# Patient Record
Sex: FEMALE | Race: WHITE
[De-identification: names, ages, dates, MRNs, and addresses within clinical notes are randomized per-mention and may not be internally consistent; named-entity substitution may affect disease eponyms.]

---

## 2024-11-12 ENCOUNTER — HOSPITAL ENCOUNTER (OUTPATIENT)
Facility: HOSPITAL | Age: 39
Setting detail: RECURRING SERIES
Discharge: HOME | End: 2024-11-15
Payer: COMMERCIAL

## 2024-11-12 PROCEDURE — 97162 PT EVAL MOD COMPLEX 30 MIN: CPT

## 2024-11-12 PROCEDURE — 97140 MANUAL THERAPY 1/> REGIONS: CPT

## 2024-11-12 PROCEDURE — 97110 THERAPEUTIC EXERCISES: CPT

## 2024-11-12 NOTE — PROGRESS NOTES
[facility]  [address] [facility] [address] [place] [address] Ph:[phone] Fx: [phone]    PLAN OF CARE/ Statement of Necessity for Physical Therapy Services           Patient name: Jameson Ruff Start of Care: 2024   Referral source: Edvin Tony MD : 1985    Medical Diagnosis: Other low back pain [M54.59]       Onset Date: 9/15/2024   Treatment Diagnosis: M54.59  OTHER LOWER BACK PAIN                                     Prior Hospitalization: see medical history Provider#: [id number]   Medications: Verified on Patient Summary List     Comorbidities: None listed    Prior Level of Function: The patient reports that she was able to go hiking, and sleep with greater ease.     The Plan of Care and following information is based on the information from the initial evaluation.    Assessment / key information:  The patient is a 39 year old female with a chief complaint of low back pain and left leg tingling with symptoms extending down into the top of her foot with an onset date of 9/15/2024. She reports that her symptoms worsened after a lot of hiking. She has taken a prednisone dose pack to good effect, but continues to have pain. She presents with a notable left upslip upon presentation to the clinic. The patient has impairments consisting of pain, decreased ROM, decreased flexibility (+ travis test), decreased strength, limited ease of sleeping, static standing, sitting. The patient will continue to benefit from skilled PT in order to address the aforementioned impairments.     Evaluation Complexity:  History:  MEDIUM  Complexity : 1-2 comorbidities / personal factors will impact the outcome/ POC ; Examination:  MEDIUM Complexity : 3 Standardized tests and measures addressin body structure, function, activity limitation and / or participation in recreation  ;Presentation:  MEDIUM Complexity : Evolving with changing characteristics  ;Clinical Decision

## 2024-11-12 NOTE — PROGRESS NOTES
PHYSICAL / OCCUPATIONAL THERAPY - DAILY TREATMENT NOTE     Patient Name: Jameson Ruff    Date: 2024    : 1985  Insurance: Payor: SHILPA / Plan: SHARRI [employer]SEY [employer] / Product Type: *No Product type* /      Patient  verified Yes     Visit #   Current / Total 1 24   Time   In / Out 3:50 4:30   Pain   In / Out 2/10 4/10   Subjective Functional Status/Changes: See IE   Changes to:  Allergies, Med Hx, Sx Hx?   no       TREATMENT AREA =  Other low back pain [M54.59]    If an interpreting service is utilized for treatment of this patient, the contents of this document represent the material reviewed with the patient via the .     OBJECTIVE  Therapeutic Procedures:  Tx Min Billable or 1:1 Min (if diff from Tx Min) Procedure, Rationale, Specifics   15  04447 Therapeutic Exercise (timed):  increase ROM, strength, coordination, balance, and proprioception to improve patient's ability to progress to PLOF and address remaining functional goals. (see flow sheet as applicable)    Details if applicable:       10  73174 Neuromuscular Re-Education (timed):  improve balance, coordination, kinesthetic sense, posture, core stability and proprioception to improve patient's ability to develop conscious control of individual muscles and awareness of position of extremities in order to progress to PLOF and address remaining functional goals. (see flow sheet as applicable)    Details if applicable:  QP LE, bird dog, bridges   8  06459 Manual Therapy (timed):  decrease pain, increase ROM, and increase tissue extensibility to improve patient's ability to progress to PLOF and address remaining functional goals.  The manual therapy interventions were performed at a separate and distinct time from the therapeutic activities interventions . Details: leg pull of left upslip SIJ with patient in prone. Shotgun maneuver.    Maneuver explained to the patient, contraindications were reviewed, and permission attained prior to

## 2024-11-20 ENCOUNTER — APPOINTMENT (OUTPATIENT)
Facility: HOSPITAL | Age: 39
End: 2024-11-20
Payer: COMMERCIAL

## 2024-11-22 ENCOUNTER — APPOINTMENT (OUTPATIENT)
Facility: HOSPITAL | Age: 39
End: 2024-11-22
Payer: COMMERCIAL

## 2024-11-26 ENCOUNTER — HOSPITAL ENCOUNTER (OUTPATIENT)
Facility: HOSPITAL | Age: 39
Setting detail: RECURRING SERIES
Discharge: HOME | End: 2024-11-29
Payer: COMMERCIAL

## 2024-11-26 PROCEDURE — 97140 MANUAL THERAPY 1/> REGIONS: CPT

## 2024-11-26 PROCEDURE — 97110 THERAPEUTIC EXERCISES: CPT

## 2024-11-26 PROCEDURE — 97112 NEUROMUSCULAR REEDUCATION: CPT

## 2024-11-26 NOTE — PROGRESS NOTES
:  []  Other:    Latoya Dobbins, PT    11/26/2024    3:14 PM    Future Appointments   Date Time Provider Department Center   11/29/2024  3:10 PM Latoya Dobbins, PT [facility]   12/4/2024  2:30 PM Kori Sanchez, PT [facility]   12/6/2024  3:10 PM Latoya Dobbins, PT [facility]   12/9/2024  3:10 PM Latoya Dobbins, PT [facility]   12/11/2024  3:10 PM Terrance Drew, PT [facility]   12/16/2024  3:10 PM Latoya Dobbins, PT [facility]   12/18/2024  3:10 PM Terrance Drew, PT [facility]   12/27/2024  2:30 PM Terrance Drew, PT [facility]   12/31/2024  9:50 AM Terrance Drew, PT [facility]   1/3/2025  2:30 PM Terrance Drwe, PT [facility]

## 2024-11-29 ENCOUNTER — HOSPITAL ENCOUNTER (OUTPATIENT)
Facility: HOSPITAL | Age: 39
Setting detail: RECURRING SERIES
Discharge: HOME | End: 2024-12-02
Payer: COMMERCIAL

## 2024-11-29 PROCEDURE — 97112 NEUROMUSCULAR REEDUCATION: CPT

## 2024-11-29 PROCEDURE — 97110 THERAPEUTIC EXERCISES: CPT

## 2024-11-29 NOTE — PROGRESS NOTES
PHYSICAL / OCCUPATIONAL THERAPY - DAILY TREATMENT NOTE     Patient Name: Jameson Ruff    Date: 2024    : 1985  Insurance: Payor: SHILPA / Plan: SHARRI MASSEY [employer] / Product Type: *No Product type* /      Patient  verified Yes     Visit #   Current / Total 3 24   Time   In / Out 310pm 359pm   Pain   In / Out 3 1   Subjective Functional Status/Changes: Pt states yesterday she had a lot of pain but today is better. States she isn't really sure why.     Pt does states she has been able to lay down when sleeping and extend her leg which is way better than a month ago.    Changes to:  Allergies, Med Hx, Sx Hx?   no       TREATMENT AREA =  Other low back pain [M54.59]    If an interpreting service is utilized for treatment of this patient, the contents of this document represent the material reviewed with the patient via the .     OBJECTIVE    Modalities Rationale:     decrease pain and increase tissue extensibility to improve patient's ability to progress to PLOF and address remaining functional goals.     min [] Estim Unattended, type/location:                                      []  w/ice    []  w/heat    min [] Estim Attended, type/location:                                     []  w/US     []  w/ice    []  w/heat    []  TENS insruct      min []  Mechanical Traction: type/lbs                   []  pro   []  sup   []  int   []  cont    []  before manual    []  after manual    min []  Ultrasound, settings/location:      min []  Iontophoresis w/ dexamethasone, location:                                               []  take home patch       []  in clinic     10   min  unbilled []  Ice     [x]  Heat    location/position: To low back reclined    min []  Paraffin,  details:     min []  Vasopneumatic Device, press/temp:     min []  Whirlpool / Fluido:    If using vaso (only need to measure limb vaso being performed on)      pre-treatment girth :       post-treatment girth :       measured at  (landmark location) :      min []  Other:    Skin assessment post-treatment (if applicable):    [x]  intact    []  redness- no adverse reaction                 []redness - adverse reaction:         Therapeutic Procedures:  Tx Min Billable or 1:1 Min (if diff from Tx Min) Procedure, Rationale, Specifics   24  07064 Therapeutic Exercise (timed):  increase ROM, strength, coordination, balance, and proprioception to improve patient's ability to progress to PLOF and address remaining functional goals. (see flow sheet as applicable)    Details if applicable:       15  94519 Neuromuscular Re-Education (timed):  improve balance, coordination, kinesthetic sense, posture, core stability and proprioception to improve patient's ability to develop conscious control of individual muscles and awareness of position of extremities in order to progress to PLOF and address remaining functional goals. (see flow sheet as applicable)    Details if applicable:            Details if applicable:           Details if applicable:            Details if applicable:     39  [facility] Totals Reminder: bill using total billable min of TIMED therapeutic procedures (example: do not include dry needle or estim unattended, both untimed codes, in totals to left)  8-22 min = 1 unit; 23-37 min = 2 units; 38-52 min = 3 units; 53-67 min = 4 units; 68-82 min = 5 units   Total Total     TOTAL TREATMENT TIME:        49     [x]  Patient Education billed concurrently with other procedures   [x] Review HEP    [] Progressed/Changed HEP, detail:    [] Other detail:       Objective Information/Functional Measures/Assessment    Held MT today due to good tolerance to exercise not in supine/no pressure on hip. Pt did have pain with supine pressure on left hip today. Progressed to quadruped FH, LE ext, and thread the needle today. Educated to continue with stretching and expected progression. Pt does report use of supine nerve glides at home as part of HEP.    Patient will

## 2024-12-04 ENCOUNTER — HOSPITAL ENCOUNTER (OUTPATIENT)
Facility: HOSPITAL | Age: 39
Setting detail: RECURRING SERIES
Discharge: HOME | End: 2024-12-07
Payer: COMMERCIAL

## 2024-12-04 PROCEDURE — 97112 NEUROMUSCULAR REEDUCATION: CPT

## 2024-12-04 PROCEDURE — 97110 THERAPEUTIC EXERCISES: CPT

## 2024-12-04 PROCEDURE — 97140 MANUAL THERAPY 1/> REGIONS: CPT

## 2024-12-04 NOTE — PROGRESS NOTES
left, 4/5 MMT right, 4-/5 MMT EXT B   The patient will report resolution of left LE referral pain in order to improve ease of community ambulation efficiency.              IE: significant improvement following SIJ manip              Current 11/29/24: pt states she can lay down to sleep and extend her legs without the burning pain down some so that is much better  The patient will demonstrate squat lifts of 15# KB without increase in pain with good form to maximize ease of chore production.              IE: increased symptoms without squatting      PLAN  Yes  Continue plan of care  []  Upgrade activities as tolerated  []  Discharge due to :  []  Other:    Mabel Weems PTA    12/4/2024    2:40 PM    Future Appointments   Date Time Provider Department Center   12/6/2024  3:10 PM Latoya Dobbins, PT [facility]   12/9/2024  3:10 PM Latoya Dobbins, PT [facility]   12/11/2024  3:10 PM Terrance Drew, PT [facility]   12/16/2024  3:10 PM Latoya Dobbins, PT [facility]   12/18/2024  3:10 PM Mabel Weems, PTA [facility]   12/27/2024  2:30 PM Terrance Drew, PT [facility]   12/31/2024  9:50 AM Terrance Drew, PT [facility]   1/3/2025  2:30 PM Terrance Drew, PT [facility]

## 2024-12-06 ENCOUNTER — HOSPITAL ENCOUNTER (OUTPATIENT)
Facility: HOSPITAL | Age: 39
Setting detail: RECURRING SERIES
Discharge: HOME | End: 2024-12-09
Payer: COMMERCIAL

## 2024-12-06 PROCEDURE — 97112 NEUROMUSCULAR REEDUCATION: CPT

## 2024-12-06 PROCEDURE — 97140 MANUAL THERAPY 1/> REGIONS: CPT

## 2024-12-06 PROCEDURE — 97110 THERAPEUTIC EXERCISES: CPT

## 2024-12-06 NOTE — PROGRESS NOTES
PHYSICAL / OCCUPATIONAL THERAPY - DAILY TREATMENT NOTE     Patient Name: Jameson Ruff    Date: 2024    : 1985  Insurance: Payor: NILDA MASSEY / Plan: SHARRI MASSEY [facility] / Product Type: *No Product type* /      Patient  verified Yes     Visit #   Current / Total 5 24   Time   In / Out 245pm 333pm   Pain   In / Out 3 6   Subjective Functional Status/Changes: Pt states she still has the pain in her hip and back and that she is taking more ibuprofen to manage the pain    Changes to:  Allergies, Med Hx, Sx Hx?   no       TREATMENT AREA =  Other low back pain [M54.59]    If an interpreting service is utilized for treatment of this patient, the contents of this document represent the material reviewed with the patient via the .     OBJECTIVE    Modalities Rationale:     decrease pain and increase tissue extensibility to improve patient's ability to progress to PLOF and address remaining functional goals.     min [] Estim Unattended, type/location:                                      []  w/ice    []  w/heat    min [] Estim Attended, type/location:                                     []  w/US     []  w/ice    []  w/heat    []  TENS insruct      min []  Mechanical Traction: type/lbs                   []  pro   []  sup   []  int   []  cont    []  before manual    []  after manual    min []  Ultrasound, settings/location:      min []  Iontophoresis w/ dexamethasone, location:                                               []  take home patch       []  in clinic     10   min  unbilled []  Ice     [x]  Heat    location/position: To low back/left glut in right sidelying    min []  Paraffin,  details:     min []  Vasopneumatic Device, press/temp:     min []  Whirlpool / Fluido:    If using vaso (only need to measure limb vaso being performed on)      pre-treatment girth :       post-treatment girth :       measured at (landmark location) :      min []  Other:    Skin assessment post-treatment (if 
130

## 2024-12-09 ENCOUNTER — HOSPITAL ENCOUNTER (OUTPATIENT)
Facility: HOSPITAL | Age: 39
Setting detail: RECURRING SERIES
Discharge: HOME | End: 2024-12-12
Payer: COMMERCIAL

## 2024-12-09 PROCEDURE — 97110 THERAPEUTIC EXERCISES: CPT

## 2024-12-09 PROCEDURE — 97112 NEUROMUSCULAR REEDUCATION: CPT

## 2024-12-09 PROCEDURE — 97535 SELF CARE MNGMENT TRAINING: CPT

## 2024-12-09 NOTE — PROGRESS NOTES
PHYSICAL / OCCUPATIONAL THERAPY - DAILY TREATMENT NOTE     Patient Name: Jameson Ruff    Date: 2024    : 1985  Insurance: Payor: [place] SHILPA / Plan: SHARRI MASSEY [facility] / Product Type: *No Product type* /      Patient  verified Yes     Visit #   Current / Total 6 24   Time   In / Out 238pm 331pm   Pain   In / Out 4 2-3   Subjective Functional Status/Changes: Pt states she is the same and still having pain. Pt denies pain with bowel movements. States continues pain with standing and movement. Pt reports same pain with sexual activities as with other tasks with no change with positioning.    Changes to:  Allergies, Med Hx, Sx Hx?   no       TREATMENT AREA =  Other low back pain [M54.59]    If an interpreting service is utilized for treatment of this patient, the contents of this document represent the material reviewed with the patient via the .     OBJECTIVE    Modalities Rationale:   pt declined    Therapeutic Procedures:  Tx Min Billable or 1:1 Min (if diff from Tx Min) Procedure, Rationale, Specifics   28  43048 Therapeutic Exercise (timed):  increase ROM, strength, coordination, balance, and proprioception to improve patient's ability to progress to PLOF and address remaining functional goals. (see flow sheet as applicable)    Details if applicable:       17  85560 Neuromuscular Re-Education (timed):  improve balance, coordination, kinesthetic sense, posture, core stability and proprioception to improve patient's ability to develop conscious control of individual muscles and awareness of position of extremities in order to progress to PLOF and address remaining functional goals. (see flow sheet as applicable)    Details if applicable:     8  62552 Self Care/Home Management (timed):  improve patient knowledge and understanding of positioning and activity modification  to improve patient's ability to progress to PLOF and address remaining functional goals.  (see flow sheet as

## 2024-12-09 NOTE — PROGRESS NOTES
[facility]  [address] [facility] [address] [place] [address] - Ph: [phone]   Fx: [phone]    PHYSICAL THERAPY PROGRESS NOTE      Patient name: Jameson Ruff Start of Care: 2024   Referral source: Edvin Tony MD : 1985               Medical Diagnosis: Other low back pain [M54.59]        Onset Date: 9/15/2024   Treatment Diagnosis: M54.59  OTHER LOWER BACK PAIN                                     Prior Hospitalization: see medical history Provider#: [id number]   Medications: Verified on Patient Summary List      Comorbidities: None listed     Prior Level of Function: The patient reports that she was able to go hiking, and sleep with greater ease.     Reporting Period: 2024-2024  Visits from Start of Care: 6    Missed Visits: 0    Goals/Measure of Progress: To be achieved in 12 weeks:    Short Term Goals: To be accomplished in 2 weeks  The patient will demonstrate independence and compliance with HEP to maximize therapeutic benefit.              IE: issued HEP              PN 24: states she has been doing the exercise at home, but still laying flat on the back hurt     The patient will improve 90/90 test to 30 degrees to improve ease of ADLs.              IE: 50 degrees left              PN 24: 25 degrees left MET     Long Term Goals: To be accomplished in 24 weeks  The patient will improve Oswestry score to 15% disability in order to improve quality of life.              IE: 62%               PN 24: 56% - PROGRESSING     The patient will attain hip ABD/EXT strength to 5/5 MMT to maximize stability in stance.              IE: 4-/5 MMT ABD left, 4/5 MMT right, 4-/5 MMT EXT B                PN 24: hip AB left 4+/5 (pain with lifting but reports alleviate pain with resistance), right 4/5 (no pain with lifting but pain worse on left with resistance applied); hip ext left NT/5, right NT/5     The patient will report

## 2024-12-11 ENCOUNTER — HOSPITAL ENCOUNTER (OUTPATIENT)
Facility: HOSPITAL | Age: 39
Setting detail: RECURRING SERIES
Discharge: HOME | End: 2024-12-14
Payer: COMMERCIAL

## 2024-12-11 PROCEDURE — 97530 THERAPEUTIC ACTIVITIES: CPT

## 2024-12-11 PROCEDURE — 97110 THERAPEUTIC EXERCISES: CPT

## 2024-12-11 PROCEDURE — 97112 NEUROMUSCULAR REEDUCATION: CPT

## 2024-12-11 PROCEDURE — 97140 MANUAL THERAPY 1/> REGIONS: CPT

## 2024-12-11 NOTE — PROGRESS NOTES
PHYSICAL / OCCUPATIONAL THERAPY - DAILY TREATMENT NOTE     Patient Name: Jameson Ruff    Date: 2024    : 1985  Insurance: Payor: NILDA MASSEY / Plan: SHARRI MASSEY [facility] / Product Type: *No Product type* /      Patient  verified Yes     Visit #   Current / Total 7 24   Time   In / Out 3:10 4:07   Pain   In / Out 3/10 5/10   Subjective Functional Status/Changes: The patient reports that her back left lower back feels about the same.   Changes to:  Allergies, Med Hx, Sx Hx?   no       TREATMENT AREA =  Other low back pain [M54.59]    If an interpreting service is utilized for treatment of this patient, the contents of this document represent the material reviewed with the patient via the .     OBJECTIVE  Modality rationale: decrease pain and increase tissue extensibility to improve the patient’s ability to improve ADL ease.    Min Type Additional Details     10 []  Ice     [x]  heat  []  Ice massage  []  Laser   []  Anodyne Position: right sidelying  Location: left hip/glute   [] Skin assessment post-treatment:  []intact []redness- no adverse reaction    []redness - adverse reaction:     Therapeutic Procedures:  Tx Min Billable or 1:1 Min (if diff from Tx Min) Procedure, Rationale, Specifics   27 15 51080 Therapeutic Exercise (timed):  increase ROM, strength, coordination, balance, and proprioception to improve patient's ability to progress to PLOF and address remaining functional goals. (see flow sheet as applicable)    Details if applicable:       12 0 70906 Neuromuscular Re-Education (timed):  improve balance, coordination, kinesthetic sense, posture, core stability and proprioception to improve patient's ability to develop conscious control of individual muscles and awareness of position of extremities in order to progress to PLOF and address remaining functional goals. (see flow sheet as applicable)    Details if applicable:     8 8 31329 Manual Therapy (timed):  decrease pain,

## 2024-12-16 ENCOUNTER — HOSPITAL ENCOUNTER (OUTPATIENT)
Facility: HOSPITAL | Age: 39
Setting detail: RECURRING SERIES
Discharge: HOME | End: 2024-12-19
Payer: COMMERCIAL

## 2024-12-16 PROCEDURE — 97112 NEUROMUSCULAR REEDUCATION: CPT

## 2024-12-16 PROCEDURE — 97110 THERAPEUTIC EXERCISES: CPT

## 2024-12-16 NOTE — PROGRESS NOTES
38-52 min = 3 units; 53-67 min = 4 units; 68-82 min = 5 units   Total Total     TOTAL TREATMENT TIME:        38     [x]  Patient Education billed concurrently with other procedures   [x] Review HEP    [] Progressed/Changed HEP, detail:    [] Other detail:       Objective Information/Functional Measures/Assessment    Pt progressed to unilateral side bends with 15# KB today with no significant change in pain. Educated pt to continue with exercise at home and monitor change to progress of exercise in clinic today. Pt able to perform quadrpued LE ext today without pain but does report continued pain with ext in prone even when trying at home    Patient will continue to benefit from skilled PT / OT services to modify and progress therapeutic interventions, analyze and address functional mobility deficits, analyze and address ROM deficits, analyze and address strength deficits, analyze and address soft tissue restrictions, and analyze and cue for proper movement patterns to address functional deficits and attain remaining goals.    Progress toward goals / Updated goals:  []  See Progress Note/Recertification    Short Term Goals: To be accomplished in 2 weeks  The patient will demonstrate independence and compliance with HEP to maximize therapeutic benefit.              IE: issued HEP              PN 12/9/24: states she has been doing the exercise at home, but still laying flat on the back hurt     The patient will improve 90/90 test to 30 degrees to improve ease of ADLs.              IE: 50 degrees left              PN 12/9/24: 25 degrees left MET     Long Term Goals: To be accomplished in 24 weeks  The patient will improve Oswestry score to 15% disability in order to improve quality of life.              IE: 62%               PN 12/9/24: 56% - PROGRESSING     The patient will attain hip ABD/EXT strength to 5/5 MMT to maximize stability in stance.              IE: 4-/5 MMT ABD left, 4/5 MMT right, 4-/5 MMT EXT B

## 2024-12-18 ENCOUNTER — HOSPITAL ENCOUNTER (OUTPATIENT)
Facility: HOSPITAL | Age: 39
Setting detail: RECURRING SERIES
Discharge: HOME | End: 2024-12-21
Payer: COMMERCIAL

## 2024-12-18 PROCEDURE — 97112 NEUROMUSCULAR REEDUCATION: CPT

## 2024-12-18 PROCEDURE — 97530 THERAPEUTIC ACTIVITIES: CPT

## 2024-12-18 PROCEDURE — 97110 THERAPEUTIC EXERCISES: CPT

## 2024-12-18 NOTE — PROGRESS NOTES
PHYSICAL / OCCUPATIONAL THERAPY - DAILY TREATMENT NOTE     Patient Name: Jameson Ruff    Date: 2024    : 1985  Insurance: Payor: [place] SHILPA / Plan: SHARRI MASSEY [facility] / Product Type: *No Product type* /      Patient  verified Yes     Visit #   Current / Total 9 24   Time   In / Out 310 404   Pain   In / Out 3 3-4   Subjective Functional Status/Changes: Pt reports \"I'm here\"   Changes to:  Allergies, Med Hx, Sx Hx?   no       TREATMENT AREA =  Other low back pain [M54.59]    If an interpreting service is utilized for treatment of this patient, the contents of this document represent the material reviewed with the patient via the .     OBJECTIVE    Therapeutic Procedures:  Tx Min Billable or 1:1 Min (if diff from Tx Min) Procedure, Rationale, Specifics   18  80175 Therapeutic Exercise (timed):  increase ROM, strength, coordination, balance, and proprioception to improve patient's ability to progress to PLOF and address remaining functional goals. (see flow sheet as applicable)    Details if applicable:       28  22572 Neuromuscular Re-Education (timed):  improve balance, coordination, kinesthetic sense, posture, core stability and proprioception to improve patient's ability to develop conscious control of individual muscles and awareness of position of extremities in order to progress to PLOF and address remaining functional goals. (see flow sheet as applicable)    Details if applicable:     8  04255 Therapeutic Activity (timed):  use of dynamic activities replicating functional movements to increase ROM, strength, coordination, balance, and proprioception in order to improve patient's ability to progress to PLOF and address remaining functional goals.  (see flow sheet as applicable)     Details if applicable:           Details if applicable:            Details if applicable:     54  [facility] Totals Reminder: bill using total billable min of TIMED therapeutic procedures (example: do not

## 2024-12-27 ENCOUNTER — HOSPITAL ENCOUNTER (OUTPATIENT)
Facility: HOSPITAL | Age: 39
Setting detail: RECURRING SERIES
Discharge: HOME | End: 2024-12-30
Payer: COMMERCIAL

## 2024-12-27 PROCEDURE — 97110 THERAPEUTIC EXERCISES: CPT

## 2024-12-27 PROCEDURE — 97140 MANUAL THERAPY 1/> REGIONS: CPT

## 2024-12-27 PROCEDURE — 97112 NEUROMUSCULAR REEDUCATION: CPT

## 2024-12-27 NOTE — PROGRESS NOTES
PHYSICAL / OCCUPATIONAL THERAPY - DAILY TREATMENT NOTE (updated )    Patient Name: Jameson Ruff    Date: 2024    : 1985  Insurance: Payor: NILDA MASSEY / Plan: SHARRI MASSEY [facility] / Product Type: *No Product type* /      Patient  verified Yes     Visit #   Current / Total 10 24   Time   In / Out 230 310   Pain   In / Out 2 1   Subjective Functional Status/Changes: The last two days have been terrible for my low back.   Changes to:  Meds, Allergies, Med Hx, Sx Hx?  If yes, update Summary List no     If an interpreting service is utilized for treatment of this patient, the contents of this document represent the material reviewed with the patient via the .   TREATMENT AREA =  Other low back pain [M54.59]  OBJECTIVE      Therapeutic Procedures:  Tx Min Billable or 1:1 Min (if diff from Tx Min) Procedure, Rationale, Specifics   22  58391 Therapeutic Exercise (timed):  increase ROM, strength, coordination, balance, and proprioception to improve patient's ability to progress to PLOF and address remaining functional goals. (see flow sheet as applicable)     Details if applicable:       10  05049 Neuromuscular Re-Education (timed):  improve balance, coordination, kinesthetic sense, posture, core stability and proprioception to improve patient's ability to develop conscious control of individual muscles and awareness of position of extremities in order to progress to PLOF and address remaining functional goals. (see flow sheet as applicable)     Details if applicable:     8  80043 Manual Therapy (timed):  decrease pain, increase ROM, increase tissue extensibility, and decrease trigger points to improve patient's ability to progress to PLOF and address remaining functional goals.  The manual therapy interventions were performed at a separate and distinct time from the therapeutic activities interventions . (see flow sheet as applicable)     Details if applicable:  STM left QL; B glutes; left

## 2024-12-31 ENCOUNTER — HOSPITAL ENCOUNTER (OUTPATIENT)
Facility: HOSPITAL | Age: 39
Setting detail: RECURRING SERIES
Discharge: HOME | End: 2025-01-03
Payer: COMMERCIAL

## 2024-12-31 PROCEDURE — 97140 MANUAL THERAPY 1/> REGIONS: CPT

## 2024-12-31 PROCEDURE — 97112 NEUROMUSCULAR REEDUCATION: CPT

## 2024-12-31 PROCEDURE — 97110 THERAPEUTIC EXERCISES: CPT

## 2024-12-31 PROCEDURE — 97530 THERAPEUTIC ACTIVITIES: CPT

## 2024-12-31 NOTE — PROGRESS NOTES
(example: do not include dry needle or estim unattended, both untimed codes, in totals to left)  8-22 min = 1 unit; 23-37 min = 2 units; 38-52 min = 3 units; 53-67 min = 4 units; 68-82 min = 5 units   Total Total     TOTAL TREATMENT TIME:        40     [x]  Patient Education billed concurrently with other procedures   [x] Review HEP    [] Progressed/Changed HEP, detail:    [] Other detail:       Objective Information/Functional Measures/Assessment    The patient is making gradual but meaningful improvement regarding functional ease of movement. Added hip hinge patterns in order to progress towards improved ease of lifting. The patient completed the session and denies any increase in pain post session. She will benefit from an additional 3-4 weeks of PT to progress her strength.    Patient will continue to benefit from skilled PT / OT services to modify and progress therapeutic interventions, analyze and address functional mobility deficits, analyze and address ROM deficits, analyze and address strength deficits, analyze and address soft tissue restrictions, analyze and cue for proper movement patterns, analyze and modify for postural abnormalities, and instruct in home and community integration to address functional deficits and attain remaining goals.    Progress toward goals / Updated goals:  []  See Progress Note/Recertification  Short Term Goals: To be accomplished in 2 weeks  The patient will demonstrate independence and compliance with HEP to maximize therapeutic benefit.              IE: issued HEP              PN 12/9/24: states she has been doing the exercise at home, but still laying flat on the back hurt     The patient will improve 90/90 test to 30 degrees to improve ease of ADLs.              IE: 50 degrees left              PN 12/9/24: 25 degrees left MET     Long Term Goals: To be accomplished in 24 weeks  The patient will improve Oswestry score to 15% disability in order to improve quality of life.

## 2025-01-03 ENCOUNTER — HOSPITAL ENCOUNTER (OUTPATIENT)
Facility: HOSPITAL | Age: 40
Setting detail: RECURRING SERIES
Discharge: HOME | End: 2025-01-06
Payer: COMMERCIAL

## 2025-01-03 PROCEDURE — 97112 NEUROMUSCULAR REEDUCATION: CPT

## 2025-01-03 PROCEDURE — 97140 MANUAL THERAPY 1/> REGIONS: CPT

## 2025-01-03 PROCEDURE — 97110 THERAPEUTIC EXERCISES: CPT

## 2025-01-03 NOTE — PROGRESS NOTES
PHYSICAL / OCCUPATIONAL THERAPY - DAILY TREATMENT NOTE     Patient Name: Jameson Ruff    Date: 1/3/2025    : 1985  Insurance: Payor: NILDA MASSEY / Plan: SHARRI MASSEY [facility] / Product Type: *No Product type* /      Patient  verified Yes     Visit #   Current / Total 12 24   Time   In / Out 2:35 3:33   Pain   In / Out 3-4/10 4/10   Subjective Functional Status/Changes: The patient states that her ITB was really painful    Changes to:  Allergies, Med Hx, Sx Hx?   no       TREATMENT AREA =  Other low back pain [M54.59]    If an interpreting service is utilized for treatment of this patient, the contents of this document represent the material reviewed with the patient via the .     OBJECTIVE  Modality rationale: decrease edema, decrease inflammation, and decrease pain to improve the patient’s ability to improve ADL ease.    Min Type Additional Details   10 [x]  Ice     []  heat  []  Ice massage  []  Laser   []  Anodyne Position: supine  Location: left glute   [] Skin assessment post-treatment:  []intact []redness- no adverse reaction    []redness - adverse reaction:     Therapeutic Procedures:  Tx Min Billable or 1:1 Min (if diff from Tx Min) Procedure, Rationale, Specifics   28  47882 Therapeutic Exercise (timed):  increase ROM, strength, coordination, balance, and proprioception to improve patient's ability to progress to PLOF and address remaining functional goals. (see flow sheet as applicable)    Details if applicable:       12  11325 Neuromuscular Re-Education (timed):  improve balance, coordination, kinesthetic sense, posture, core stability and proprioception to improve patient's ability to develop conscious control of individual muscles and awareness of position of extremities in order to progress to PLOF and address remaining functional goals. (see flow sheet as applicable)    Details if applicable:     8  03629 Manual Therapy (timed):  decrease pain, increase ROM, and increase tissue

## 2025-01-07 ENCOUNTER — HOSPITAL ENCOUNTER (OUTPATIENT)
Facility: HOSPITAL | Age: 40
Setting detail: RECURRING SERIES
Discharge: HOME | End: 2025-01-10
Payer: COMMERCIAL

## 2025-01-07 PROCEDURE — 97530 THERAPEUTIC ACTIVITIES: CPT

## 2025-01-07 PROCEDURE — 97112 NEUROMUSCULAR REEDUCATION: CPT

## 2025-01-07 PROCEDURE — 97110 THERAPEUTIC EXERCISES: CPT

## 2025-01-07 NOTE — PROGRESS NOTES
[facility]  [address] [facility] [address] [place] [address] - Ph: [phone]   Fx: [phone]    PHYSICAL THERAPY PROGRESS NOTE      Patient name: Jameson Ruff Start of Care: 2024    Referral source: Edvin Tony MD : 1985    Medical Diagnosis: Other low back pain [M54.59]  [facility] / Plan: SHARRI [facility] / Product Type: *No Product type* /  Onset Date: 9/15/2024     Treatment Diagnosis: M54.59  OTHER LOWER BACK PAIN                                Prior Hospitalization: see medical history Provider#: [id number]   Comorbidities:  None listed   Prior Level of Function:The patient reports that she was able to go hiking, and sleep with greater ease.     Reporting Period: 24-25  Visits from Start of Care: 13    Missed Visits: 0    Goals/Measure of Progress: To be achieved in 24 treatments:    Short Term Goals: To be accomplished in 2 weeks  The patient will demonstrate independence and compliance with HEP to maximize therapeutic benefit.              IE: issued HEP              PN 24: states she has been doing the exercise at home, but still laying flat on the back hurt     The patient will improve 90/90 test to 30 degrees to improve ease of ADLs.              IE: 50 degrees left              PN 24: 25 degrees left MET     Long Term Goals: To be accomplished in 24 weeks  The patient will improve Oswestry score to 15% disability in order to improve quality of life.              IE: 62%               PN on 25: Progressing: Oswestry: 50%   The patient will attain hip ABD/EXT strength to 5/5 MMT to maximize stability in stance.              IE: 4-/5 MMT ABD left, 4/5 MMT right, 4-/5 MMT EXT B                PN on 25: Progressing: Hip strength: abduction: (B) 5/5;  extension: left: 4-/5; right: 4-/5    The patient will report resolution of left LE referral pain in order to improve ease of community

## 2025-01-07 NOTE — PROGRESS NOTES
PHYSICAL / OCCUPATIONAL THERAPY - DAILY TREATMENT NOTE     Patient Name: Jameson Ruff    Date: 2025    : 1985  Insurance: Payor: NILDA MASSEY / Plan: SHARRI MASSEY [facility] / Product Type: *No Product type* /      Patient  verified Yes     Visit #   Current / Total 13 24   Time   In / Out 9:11 10:05   Pain   In / Out 2 2   Subjective Functional Status/Changes: Patient stated that she has noticed some improvement with being able to sleep with left leg straight, but still has difficulty sleeping through the night.    Changes to:  Allergies, Med Hx, Sx Hx?   no       TREATMENT AREA =  Other low back pain [M54.59]    If an interpreting service is utilized for treatment of this patient, the contents of this document represent the material reviewed with the patient via the .     OBJECTIVE    Modalities Rationale:     patient declined     Therapeutic Procedures:  Tx Min Billable or 1:1 Min (if diff from Tx Min) Procedure, Rationale, Specifics   29  00569 Therapeutic Exercise (timed):  increase ROM, strength, coordination, balance, and proprioception to improve patient's ability to progress to PLOF and address remaining functional goals. (see flow sheet as applicable)    Details if applicable:    Prone HS curl: 5x each    15  83155 Neuromuscular Re-Education (timed):  improve balance, coordination, kinesthetic sense, posture, core stability and proprioception to improve patient's ability to develop conscious control of individual muscles and awareness of position of extremities in order to progress to PLOF and address remaining functional goals. (see flow sheet as applicable)    Details if applicable:     10  47653 Therapeutic Activity (timed):  use of dynamic activities replicating functional movements to increase ROM, strength, coordination, balance, and proprioception in order to improve patient's ability to progress to PLOF and address remaining functional goals.  (see flow sheet as applicable)

## 2025-01-14 ENCOUNTER — HOSPITAL ENCOUNTER (OUTPATIENT)
Facility: HOSPITAL | Age: 40
Setting detail: RECURRING SERIES
Discharge: HOME | End: 2025-01-17
Payer: COMMERCIAL

## 2025-01-14 PROCEDURE — 97140 MANUAL THERAPY 1/> REGIONS: CPT

## 2025-01-14 PROCEDURE — 97110 THERAPEUTIC EXERCISES: CPT

## 2025-01-14 PROCEDURE — 97112 NEUROMUSCULAR REEDUCATION: CPT

## 2025-01-14 NOTE — PROGRESS NOTES
stability in stance.              IE: 4-/5 MMT ABD left, 4/5 MMT right, 4-/5 MMT EXT B               PN on 1/7/25: Progressing: Hip strength: abduction: (B) 5/5;  extension: left: 4-/5; right: 4-/5    The patient will report resolution of left LE referral pain in order to improve ease of community ambulation efficiency.              IE: significant improvement following SIJ manip              PN on 1/7/25: Not met; Patient reports she feels referral pain down left LE 30% of the day when standing up right and with walking. Patient reports relief with sitting.  The patient will demonstrate squat lifts of 15# KB without increase in pain with good form to maximize ease of chore production.              IE: increased symptoms without squatting              PN on 1/7/25: progressing: Patient able to perform squats with 10# kettle bell with report of 3/10 pain level.     PLAN  Yes  Continue plan of care  []  Upgrade activities as tolerated  []  Discharge due to :  []  Other:    Nicanor Renteria PTA    1/14/2025    3:49 PM    Future Appointments   Date Time Provider Department Center   1/14/2025  3:50 PM [facility]   1/16/2025  3:10 PM [facility]   1/21/2025  4:30 PM [facility]   1/23/2025  3:50 PM [facility]   1/28/2025  4:30 PM [facility]

## 2025-01-16 ENCOUNTER — TELEPHONE (OUTPATIENT)
Facility: HOSPITAL | Age: 40
End: 2025-01-16

## 2025-01-16 ENCOUNTER — APPOINTMENT (OUTPATIENT)
Facility: HOSPITAL | Age: 40
End: 2025-01-16
Payer: COMMERCIAL

## 2025-01-21 ENCOUNTER — HOSPITAL ENCOUNTER (OUTPATIENT)
Facility: HOSPITAL | Age: 40
Setting detail: RECURRING SERIES
Discharge: HOME | End: 2025-01-24
Payer: COMMERCIAL

## 2025-01-21 PROCEDURE — 97140 MANUAL THERAPY 1/> REGIONS: CPT

## 2025-01-21 PROCEDURE — 97110 THERAPEUTIC EXERCISES: CPT

## 2025-01-21 PROCEDURE — 97012 MECHANICAL TRACTION THERAPY: CPT

## 2025-01-21 NOTE — PROGRESS NOTES
PHYSICAL / OCCUPATIONAL THERAPY - DAILY TREATMENT NOTE     Patient Name: Jameson Ruff    Date: 2025    : 1985  Insurance: Payor: [place] [employer]IL[employer] / Plan: SHARRI MASSEY [facility] / Product Type: *No Product type* /      Patient  verified Yes     Visit #   Current / Total 15 24   Time   In / Out 4:30 5:22   Pain   In / Out 6/10 3/10   Subjective Functional Status/Changes: The patient reports that she has increased pain down her left leg after her grandfather .    She states tonight she is doing \"terrible\".    Changes to:  Allergies, Med Hx, Sx Hx?   no       TREATMENT AREA =  Other low back pain [M54.59]    If an interpreting service is utilized for treatment of this patient, the contents of this document represent the material reviewed with the patient via the .     OBJECTIVE  Modalities Rationale:      Traction  to improve patient's ability to progress to PLOF and address remaining functional goals.    10 min [x]  Mechanical Traction: type/lbs  Prone; 55#/45#                  [x]  pro   []  sup   []  int   []  cont    []  before manual    [x]  after manual   Skin assessment post-treatment (if applicable):    []  intact    []  redness- no adverse reaction                 []redness - adverse reaction:         Therapeutic Procedures:  Tx Min Billable or 1:1 Min (if diff from Tx Min) Procedure, Rationale, Specifics   34  11704 Therapeutic Exercise (timed):  increase ROM, strength, coordination, balance, and proprioception to improve patient's ability to progress to PLOF and address remaining functional goals. (see flow sheet as applicable)    Details if applicable:       8  17045 Manual Therapy (timed):  decrease pain, increase ROM, and increase tissue extensibility to improve patient's ability to progress to PLOF and address remaining functional goals.  The manual therapy interventions were performed at a separate and distinct time from the therapeutic activities interventions . Details: Lumbar

## 2025-01-23 ENCOUNTER — HOSPITAL ENCOUNTER (OUTPATIENT)
Facility: HOSPITAL | Age: 40
Setting detail: RECURRING SERIES
Discharge: HOME | End: 2025-01-26
Payer: COMMERCIAL

## 2025-01-23 PROCEDURE — 97110 THERAPEUTIC EXERCISES: CPT

## 2025-01-23 PROCEDURE — 97112 NEUROMUSCULAR REEDUCATION: CPT

## 2025-01-23 PROCEDURE — 97140 MANUAL THERAPY 1/> REGIONS: CPT

## 2025-01-23 NOTE — PROGRESS NOTES
PT DISCHARGE DAILY NOTE AND SUMMARY     If signature is requested please return to:    [facility]  Fax: [phone]    Date:2025  Patient name: Jameson Ruff Start of Care: 24   Referral source: Edvin Tony MD : 1985   Medical/Treatment Diagnosis: Other low back pain [M54.59] Onset Date:9/15/24     Prior Hospitalization: see medical history Provider#: [id number]   Comorbidities:  None listed   Prior Level of Function:The patient reports that she was able to go hiking, and sleep with greater ease.      Insurance: Payor: [place] [employer] / Plan: [employer] [facility] / Product Type: *No Product type* /      Visits from Start of Care: 16 Missed Visits: 1    non-Medicare    Patient  verified yes     Visit #   Current / Total 16 24   Time   In / Out 350 430   Pain   In / Out 2 2   Subjective Functional Status/Changes: Patient reports therapy isn't helping.      TREATMENT AREA =  Other low back pain [M54.59]      OBJECTIVE  Therapeutic Procedures:    Tx Min Billable or 1:1 Min (if diff from Tx Min) Procedure, Rationale, Specifics   24  23335 Therapeutic Exercise (timed):  increase ROM, strength, coordination, balance, and proprioception to improve patient's ability to progress to PLOF and address remaining functional goals. (see flow sheet as applicable)     Details if applicable:       8  42364 Neuromuscular Re-Education (timed):  improve balance, coordination, kinesthetic sense, posture, core stability and proprioception to improve patient's ability to develop conscious control of individual muscles and awareness of position of extremities in order to progress to PLOF and address remaining functional goals. (see flow sheet as applicable)     Details if applicable:     8  13260 Manual Therapy (timed):  decrease pain and increase ROM to improve patient's ability to progress to PLOF and address remaining functional goals.  The manual therapy interventions were performed at a separate

## 2025-01-28 ENCOUNTER — APPOINTMENT (OUTPATIENT)
Facility: HOSPITAL | Age: 40
End: 2025-01-28
Payer: COMMERCIAL

## 2025-07-09 ENCOUNTER — TRANSCRIBE ORDERS (OUTPATIENT)
Facility: HOSPITAL | Age: 40
End: 2025-07-09

## 2025-07-09 DIAGNOSIS — E04.1 NONTOXIC SINGLE THYROID NODULE: Primary | ICD-10-CM

## 2025-07-09 DIAGNOSIS — E04.1 THYROID NODULE: ICD-10-CM

## 2025-07-28 ENCOUNTER — HOSPITAL ENCOUNTER (OUTPATIENT)
Facility: HOSPITAL | Age: 40
Discharge: HOME | End: 2025-07-31
Attending: OTOLARYNGOLOGY
Payer: COMMERCIAL

## 2025-07-28 DIAGNOSIS — E04.1 NONTOXIC SINGLE THYROID NODULE: ICD-10-CM

## 2025-07-28 DIAGNOSIS — E04.1 THYROID NODULE: ICD-10-CM

## 2025-07-28 PROCEDURE — 76536 US EXAM OF HEAD AND NECK: CPT
